# Patient Record
Sex: FEMALE | Race: OTHER
[De-identification: names, ages, dates, MRNs, and addresses within clinical notes are randomized per-mention and may not be internally consistent; named-entity substitution may affect disease eponyms.]

---

## 2018-10-15 ENCOUNTER — HOSPITAL ENCOUNTER (EMERGENCY)
Dept: HOSPITAL 25 - ED | Age: 23
Discharge: HOME | End: 2018-10-15
Payer: COMMERCIAL

## 2018-10-15 VITALS — DIASTOLIC BLOOD PRESSURE: 63 MMHG | SYSTOLIC BLOOD PRESSURE: 106 MMHG

## 2018-10-15 DIAGNOSIS — R53.1: ICD-10-CM

## 2018-10-15 DIAGNOSIS — M54.2: ICD-10-CM

## 2018-10-15 DIAGNOSIS — M54.9: Primary | ICD-10-CM

## 2018-10-15 DIAGNOSIS — R11.0: ICD-10-CM

## 2018-10-15 LAB
BASOPHILS # BLD AUTO: 0 10^3/UL (ref 0–0.2)
EOSINOPHIL # BLD AUTO: 0.1 10^3/UL (ref 0–0.6)
HCT VFR BLD AUTO: 37 % (ref 35–47)
HGB BLD-MCNC: 12.6 G/DL (ref 12–16)
LYMPHOCYTES # BLD AUTO: 2.3 10^3/UL (ref 1–4.8)
MCH RBC QN AUTO: 32 PG (ref 27–31)
MCHC RBC AUTO-ENTMCNC: 34 G/DL (ref 31–36)
MCV RBC AUTO: 94 FL (ref 80–97)
MONOCYTES # BLD AUTO: 0.8 10^3/UL (ref 0–0.8)
NEUTROPHILS # BLD AUTO: 3.6 10^3/UL (ref 1.5–7.7)
NRBC # BLD AUTO: 0 10^3/UL
NRBC BLD QL AUTO: 0.1
PLATELET # BLD AUTO: 240 10^3/UL (ref 150–450)
RBC # BLD AUTO: 3.95 10^6/UL (ref 4–5.4)
WBC # BLD AUTO: 6.9 10^3/UL (ref 3.5–10.8)

## 2018-10-15 PROCEDURE — 96372 THER/PROPH/DIAG INJ SC/IM: CPT

## 2018-10-15 PROCEDURE — 82550 ASSAY OF CK (CPK): CPT

## 2018-10-15 PROCEDURE — 83735 ASSAY OF MAGNESIUM: CPT

## 2018-10-15 PROCEDURE — 85025 COMPLETE CBC W/AUTO DIFF WBC: CPT

## 2018-10-15 PROCEDURE — 84702 CHORIONIC GONADOTROPIN TEST: CPT

## 2018-10-15 PROCEDURE — 99282 EMERGENCY DEPT VISIT SF MDM: CPT

## 2018-10-15 PROCEDURE — 80053 COMPREHEN METABOLIC PANEL: CPT

## 2018-10-15 PROCEDURE — 36415 COLL VENOUS BLD VENIPUNCTURE: CPT

## 2018-10-15 NOTE — ED
Complex/Multi-Sys Presentation





- HPI Summary


HPI Summary: 


Patient is a 23 y/o F w/ c/o upper back and neck pain, tightness in her ears, 

and weakness of her arms onsetting today a couple of hours ago. Upper back and 

neck pain is described as a burning. Nausea is reported, fever, V/D is denied. 

LNMP was less than a month ago, patient does not take birth control but denies 

sexual activity. On triage, pain is rated 4/10, nothing is noted to aggravate/

alleviate Sx. Home medications and allergies are reviewed. 








- History Of Current Complaint


Chief Complaint: EDGeneral


Time Seen by Provider: 10/15/18 04:02


Hx Obtained From: Patient


Onset/Duration: Lasting Hours - onset a couple of hours ago, Still Present


Timing: Constant


Severity Currently: Moderate - 4/10


Aggravating Factor(s): nothing


Alleviating Factor(s): nothing


Associated Signs And Symptoms: Positive: Nausea, Back Pain - upper, Other - 

neck pain, ear tightness, arm weakness.  Negative: Vomiting, Diarrhea, Fever





- Allergies/Home Medications


Allergies/Adverse Reactions: 


 Allergies











Allergy/AdvReac Type Severity Reaction Status Date / Time


 


No Known Allergies Allergy   Verified 10/15/18 03:51














PMH/Surg Hx/FS Hx/Imm Hx


Sensory History: 


   Denies: Hx Legally Blind, Hx Deafness


Opthamlomology History: 


   Denies: Hx Legally Blind


EENT History: 


   Denies: Hx Deafness





- Immunization History


Immunizations Up to Date: Yes


Infectious Disease History: No


Infectious Disease History: 


   Denies: Traveled Outside the [place] in Last 30 Days





- Family History


Known Family History: 


   Negative: Blood Disorder





- Social History


Alcohol Use: None


Substance Use Type: Reports: None


Smoking Status (MU): Never Smoked Tobacco





Review of Systems


Negative: Fever


Positive: Other - ear tightness 


Positive: Nausea.  Negative: Vomiting, Diarrhea


Positive: Other - neck pain, upper back pain 


Positive: Weakness - arms 


All Other Systems Reviewed And Are Negative: Yes





Physical Exam





- Summary


Physical Exam Summary: 


VITAL SIGNS: Reviewed.


GENERAL:  Patient is a well-developed and nourished female who is lying 

comfortable in the stretcher. Patient is not in any acute respiratory distress.


HEAD AND FACE: No signs of trauma. No ecchymosis, hematomas or skull 

depressions. No sinus tenderness.


EYES: PERRLA, EOMI x 2, No injected conjunctiva, no nystagmus.


EARS: Hearing grossly intact. Ear canals and tympanic membranes are within 

normal limits.


MOUTH: Oropharynx within normal limits.


NECK: Supple, trachea is midline, no adenopathy, no JVD, no carotid bruit, no c-

spine tenderness, neck with full ROM.


CHEST: Symmetric, no tenderness at palpation


LUNGS: Clear to auscultation bilaterally. No wheezing or crackles.


CVS: Regular rate and rhythm, S1 and S2 present, no murmurs or gallops 

appreciated.


ABDOMEN: Soft, non-tender. No signs of distention. No rebound no guarding, and 

no masses palpated. Bowel sounds are normal.


EXTREMITIES: FROM in all major joints, no edema, no cyanosis or clubbing.


NEURO: Alert and oriented x 3. No acute neurological deficits. Speech is normal 

and follows commands.


SKIN: Dry and warm








Triage Information Reviewed: Yes


Vital Signs On Initial Exam: 


 Initial Vitals











Temp Pulse Resp BP Pulse Ox


 


 98.1 F   79   18   128/81   98 


 


 10/15/18 03:50  10/15/18 03:50  10/15/18 03:50  10/15/18 03:50  10/15/18 03:50











Vital Signs Reviewed: Yes





Diagnostics





- Vital Signs


 Vital Signs











  Temp Pulse Resp BP Pulse Ox


 


 10/15/18 05:00   57    99


 


 10/15/18 04:38     120/80 


 


 10/15/18 04:08   69   129/78  98


 


 10/15/18 04:07   80    98


 


 10/15/18 03:50  98.1 F  79  18  128/81  98














- Laboratory


Lab Results: 


 Lab Results











  10/15/18 10/15/18 Range/Units





  04:29 04:29 


 


WBC  6.9   (3.5-10.8)  10^3/ul


 


RBC  3.95 L   (4.00-5.40)  10^6/ul


 


Hgb  12.6   (12.0-16.0)  g/dl


 


Hct  37   (35-47)  %


 


MCV  94   (80-97)  fL


 


MCH  32 H   (27-31)  pg


 


MCHC  34   (31-36)  g/dl


 


RDW  13   (10.5-15)  %


 


Plt Count  240   (150-450)  10^3/ul


 


MPV  8.1   (7.4-10.4)  um3


 


Neut % (Auto)  52.5   (38-83)  %


 


Lymph % (Auto)  33.8   (25-47)  %


 


Mono % (Auto)  11.9 H   (0-7)  %


 


Eos % (Auto)  1.2   (0-6)  %


 


Baso % (Auto)  0.6   (0-2)  %


 


Absolute Neuts (auto)  3.6   (1.5-7.7)  10^3/ul


 


Absolute Lymphs (auto)  2.3   (1.0-4.8)  10^3/ul


 


Absolute Monos (auto)  0.8   (0-0.8)  10^3/ul


 


Absolute Eos (auto)  0.1   (0-0.6)  10^3/ul


 


Absolute Basos (auto)  0   (0-0.2)  10^3/ul


 


Absolute Nucleated RBC  0   10^3/ul


 


Nucleated RBC %  0.1   


 


Sodium   137  (135-145)  mmol/L


 


Potassium   3.5  (3.5-5.0)  mmol/L


 


Chloride   102  (101-111)  mmol/L


 


Carbon Dioxide   30  (22-32)  mmol/L


 


Anion Gap   5  (2-11)  mmol/L


 


BUN   10  (6-24)  mg/dL


 


Creatinine   0.90  (0.51-0.95)  mg/dL


 


Est GFR ( Amer)   95.6  (>60)  


 


Est GFR (Non-Af Amer)   79.0  (>60)  


 


BUN/Creatinine Ratio   11.1  (8-20)  


 


Glucose   106 H  ()  mg/dL


 


Calcium   9.6  (8.6-10.3)  mg/dL


 


Magnesium   1.9  (1.9-2.7)  mg/dL


 


Total Bilirubin   0.50  (0.2-1.0)  mg/dL


 


AST   19  (13-39)  U/L


 


ALT   13  (7-52)  U/L


 


Alkaline Phosphatase   49  ()  U/L


 


Total Creatine Kinase   191  ()  U/L


 


Total Protein   7.1  (6.4-8.9)  g/dL


 


Albumin   4.5  (3.2-5.2)  g/dL


 


Globulin   2.6  (2-4)  g/dL


 


Albumin/Globulin Ratio   1.7  (1-3)  


 


Beta HCG, Quant   < 0.60  mIU/mL











Result Diagrams: 


 10/15/18 04:29





 10/15/18 04:29


Lab Statement: Any lab studies that have been ordered have been reviewed, and 

results considered in the medical decision making process.





Re-Evaluation





- Re-Evaluation


  ** First Eval


Re-Evaluation Time: 05:10


Change: Improved


Comment: Patient feels better, will be discharged to home, instructed to follow 

up with PCP in 1-2 days. She is agreeable with discharge.





Complex Multi-Symp Course/Dx


Course Of Treatment: Patient is a 23 y/o F w/ c/o upper back and neck pain, 

tightness in her ears, and weakness of her arms onsetting today a couple of 

hours ago. Upper back and neck pain is described as a burning. Nausea is 

reported, fever, V/D is denied. LNMP was less than a month ago, patient does 

not take birth control but denies sexual activity. Physical exam showed no 

abnormal findings. During ED course, patient was given toradol 60 mg IM ONCE 

and reglan 10 mg PO ONCE.  Labs showed no abnormal findings. 0510 - Patient 

feels better, will be discharged to home, instructed to follow up with PCP in 1-

2 days. She is agreeable with discharge. Dx of upper back pain.





- Diagnoses


Provider Diagnoses: 


 Upper back pain








Discharge





- Sign-Out/Discharge


Documenting (check all that apply): Patient Departure - discharge 





- Discharge Plan


Condition: Stable


Disposition: HOME


Patient Education Materials:  Back Pain (ED)


Referrals: 


[facility] [Outside] - 2 Days


Additional Instructions: 


RETURN TO THE EMERGENCY DEPARTMENT FOR CHANGING OR WORSENING SYMPTOMS. FOLLOW 

UP WITH PRIMARY CARE PHYSICIAN IN 1-2 DAYS.





- Attestation Statements


Document Initiated by Scribe: Yes


Documenting Scribe: Patricio Orr 


Provider For Whom Liyah is Documenting (Include Credential): Cherrie Mendoza MD


Scribe Attestation: 


Patricio DICKERSON , scribed for Cherrie Mendoza MD on 10/15/18 at 0534.